# Patient Record
Sex: MALE | Race: WHITE | NOT HISPANIC OR LATINO | Employment: FULL TIME | URBAN - METROPOLITAN AREA
[De-identification: names, ages, dates, MRNs, and addresses within clinical notes are randomized per-mention and may not be internally consistent; named-entity substitution may affect disease eponyms.]

---

## 2021-10-12 ENCOUNTER — APPOINTMENT (OUTPATIENT)
Dept: GENERAL RADIOLOGY | Facility: HOSPITAL | Age: 41
End: 2021-10-12

## 2021-10-12 PROCEDURE — 73110 X-RAY EXAM OF WRIST: CPT | Performed by: EMERGENCY MEDICINE

## 2022-05-22 ENCOUNTER — HOSPITAL ENCOUNTER (EMERGENCY)
Facility: HOSPITAL | Age: 42
Discharge: LEFT BEFORE DISCHARGE | End: 2022-05-22

## 2022-05-22 VITALS
BODY MASS INDEX: 27.61 KG/M2 | OXYGEN SATURATION: 99 % | DIASTOLIC BLOOD PRESSURE: 77 MMHG | SYSTOLIC BLOOD PRESSURE: 126 MMHG | HEART RATE: 87 BPM | WEIGHT: 175.93 LBS | RESPIRATION RATE: 16 BRPM | TEMPERATURE: 97.6 F | HEIGHT: 67 IN

## 2022-05-22 LAB — QT INTERVAL: 343 MS

## 2022-05-22 PROCEDURE — 93005 ELECTROCARDIOGRAM TRACING: CPT

## 2022-05-22 PROCEDURE — 99211 OFF/OP EST MAY X REQ PHY/QHP: CPT

## 2023-10-09 ENCOUNTER — OFFICE VISIT (OUTPATIENT)
Dept: INTERNAL MEDICINE | Facility: CLINIC | Age: 43
End: 2023-10-09
Payer: COMMERCIAL

## 2023-10-09 VITALS
HEIGHT: 67 IN | HEART RATE: 61 BPM | RESPIRATION RATE: 14 BRPM | OXYGEN SATURATION: 98 % | DIASTOLIC BLOOD PRESSURE: 62 MMHG | SYSTOLIC BLOOD PRESSURE: 98 MMHG | BODY MASS INDEX: 27 KG/M2 | WEIGHT: 172 LBS

## 2023-10-09 DIAGNOSIS — Z00.00 HEALTHCARE MAINTENANCE: ICD-10-CM

## 2023-10-09 DIAGNOSIS — Z23 NEED FOR TDAP VACCINATION: ICD-10-CM

## 2023-10-09 DIAGNOSIS — R53.83 EXHAUSTION: ICD-10-CM

## 2023-10-09 DIAGNOSIS — R56.9 SEIZURE: ICD-10-CM

## 2023-10-09 DIAGNOSIS — Z23 NEEDS FLU SHOT: ICD-10-CM

## 2023-10-09 DIAGNOSIS — Z00.00 VISIT FOR WELL MAN HEALTH CHECK: Primary | ICD-10-CM

## 2023-10-10 LAB
ALBUMIN SERPL-MCNC: 4.8 G/DL (ref 3.5–5.2)
ALBUMIN/GLOB SERPL: 1.7 G/DL
ALP SERPL-CCNC: 100 U/L (ref 39–117)
ALT SERPL-CCNC: 20 U/L (ref 1–41)
AST SERPL-CCNC: 21 U/L (ref 1–40)
BASOPHILS # BLD AUTO: 0.04 10*3/MM3 (ref 0–0.2)
BASOPHILS NFR BLD AUTO: 0.7 % (ref 0–1.5)
BILIRUB SERPL-MCNC: 0.4 MG/DL (ref 0–1.2)
BUN SERPL-MCNC: 16 MG/DL (ref 6–20)
BUN/CREAT SERPL: 14.4 (ref 7–25)
CALCIUM SERPL-MCNC: 10.1 MG/DL (ref 8.6–10.5)
CHLORIDE SERPL-SCNC: 105 MMOL/L (ref 98–107)
CHOLEST SERPL-MCNC: 221 MG/DL (ref 0–200)
CO2 SERPL-SCNC: 26.7 MMOL/L (ref 22–29)
CREAT SERPL-MCNC: 1.11 MG/DL (ref 0.76–1.27)
EGFRCR SERPLBLD CKD-EPI 2021: 84.5 ML/MIN/1.73
EOSINOPHIL # BLD AUTO: 0.07 10*3/MM3 (ref 0–0.4)
EOSINOPHIL NFR BLD AUTO: 1.3 % (ref 0.3–6.2)
ERYTHROCYTE [DISTWIDTH] IN BLOOD BY AUTOMATED COUNT: 13.4 % (ref 12.3–15.4)
FT4I SERPL CALC-MCNC: 2.2 (ref 1.2–4.9)
GLOBULIN SER CALC-MCNC: 2.9 GM/DL
GLUCOSE SERPL-MCNC: 96 MG/DL (ref 65–99)
HBA1C MFR BLD: 5.6 % (ref 4.8–5.6)
HCT VFR BLD AUTO: 49 % (ref 37.5–51)
HDLC SERPL-MCNC: 47 MG/DL (ref 40–60)
HGB BLD-MCNC: 16.3 G/DL (ref 13–17.7)
IMM GRANULOCYTES # BLD AUTO: 0.01 10*3/MM3 (ref 0–0.05)
IMM GRANULOCYTES NFR BLD AUTO: 0.2 % (ref 0–0.5)
LDLC SERPL CALC-MCNC: 164 MG/DL (ref 0–100)
LDLC/HDLC SERPL: 3.45 {RATIO}
LYMPHOCYTES # BLD AUTO: 1.57 10*3/MM3 (ref 0.7–3.1)
LYMPHOCYTES NFR BLD AUTO: 28.8 % (ref 19.6–45.3)
MCH RBC QN AUTO: 27.6 PG (ref 26.6–33)
MCHC RBC AUTO-ENTMCNC: 33.3 G/DL (ref 31.5–35.7)
MCV RBC AUTO: 82.9 FL (ref 79–97)
MONOCYTES # BLD AUTO: 0.52 10*3/MM3 (ref 0.1–0.9)
MONOCYTES NFR BLD AUTO: 9.5 % (ref 5–12)
NEUTROPHILS # BLD AUTO: 3.25 10*3/MM3 (ref 1.7–7)
NEUTROPHILS NFR BLD AUTO: 59.5 % (ref 42.7–76)
NRBC BLD AUTO-RTO: 0 /100 WBC (ref 0–0.2)
PLATELET # BLD AUTO: 304 10*3/MM3 (ref 140–450)
POTASSIUM SERPL-SCNC: 5 MMOL/L (ref 3.5–5.2)
PROT SERPL-MCNC: 7.7 G/DL (ref 6–8.5)
RBC # BLD AUTO: 5.91 10*6/MM3 (ref 4.14–5.8)
SODIUM SERPL-SCNC: 140 MMOL/L (ref 136–145)
T3RU NFR SERPL: 28 % (ref 24–39)
T4 SERPL-MCNC: 7.9 UG/DL (ref 4.5–12)
TRIGL SERPL-MCNC: 59 MG/DL (ref 0–150)
TSH SERPL DL<=0.005 MIU/L-ACNC: 2.07 UIU/ML (ref 0.45–4.5)
VLDLC SERPL CALC-MCNC: 10 MG/DL (ref 5–40)
WBC # BLD AUTO: 5.46 10*3/MM3 (ref 3.4–10.8)

## 2023-10-13 ENCOUNTER — PATIENT ROUNDING (BHMG ONLY) (OUTPATIENT)
Dept: INTERNAL MEDICINE | Facility: CLINIC | Age: 43
End: 2023-10-13
Payer: COMMERCIAL

## 2024-09-30 ENCOUNTER — TELEPHONE (OUTPATIENT)
Dept: INTERNAL MEDICINE | Facility: CLINIC | Age: 44
End: 2024-09-30
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
## 2024-10-08 ENCOUNTER — OFFICE VISIT (OUTPATIENT)
Dept: INTERNAL MEDICINE | Facility: CLINIC | Age: 44
End: 2024-10-08
Payer: OTHER MISCELLANEOUS

## 2024-10-08 VITALS
BODY MASS INDEX: 26.85 KG/M2 | DIASTOLIC BLOOD PRESSURE: 84 MMHG | HEART RATE: 84 BPM | OXYGEN SATURATION: 99 % | SYSTOLIC BLOOD PRESSURE: 118 MMHG | TEMPERATURE: 98.6 F | WEIGHT: 171.1 LBS | HEIGHT: 67 IN

## 2024-10-08 DIAGNOSIS — Z23 FLU VACCINE NEED: ICD-10-CM

## 2024-10-08 DIAGNOSIS — M25.511 ACUTE PAIN OF RIGHT SHOULDER: Primary | ICD-10-CM

## 2024-10-08 PROCEDURE — 99214 OFFICE O/P EST MOD 30 MIN: CPT | Performed by: FAMILY MEDICINE

## 2024-10-08 PROCEDURE — 90471 IMMUNIZATION ADMIN: CPT | Performed by: FAMILY MEDICINE

## 2024-10-08 PROCEDURE — 90656 IIV3 VACC NO PRSV 0.5 ML IM: CPT | Performed by: FAMILY MEDICINE

## 2024-10-08 RX ORDER — DIPHENOXYLATE HYDROCHLORIDE AND ATROPINE SULFATE 2.5; .025 MG/1; MG/1
TABLET ORAL
COMMUNITY
Start: 2024-08-20

## 2024-10-08 RX ORDER — OXYCODONE HYDROCHLORIDE 5 MG/1
TABLET ORAL
COMMUNITY
Start: 2024-08-20

## 2024-10-08 RX ORDER — EPINEPHRINE 0.3 MG/.3ML
INJECTION SUBCUTANEOUS
COMMUNITY
Start: 2024-05-02

## 2024-10-08 RX ORDER — SENNOSIDES 8.6 MG
1000 CAPSULE ORAL
COMMUNITY
Start: 2024-08-20

## 2024-10-08 RX ORDER — MONTELUKAST SODIUM 10 MG/1
10 TABLET ORAL
COMMUNITY

## 2024-10-19 ENCOUNTER — HOSPITAL ENCOUNTER (OUTPATIENT)
Dept: MRI IMAGING | Facility: HOSPITAL | Age: 44
Discharge: HOME | End: 2024-10-19
Admitting: FAMILY MEDICINE
Payer: COMMERCIAL

## 2024-10-19 DIAGNOSIS — M25.511 ACUTE PAIN OF RIGHT SHOULDER: ICD-10-CM

## 2024-10-19 PROCEDURE — 73221 MRI JOINT UPR EXTREM W/O DYE: CPT

## 2024-10-30 DIAGNOSIS — S22.31XA CLOSED FRACTURE OF ONE RIB OF RIGHT SIDE, INITIAL ENCOUNTER: ICD-10-CM

## 2024-10-30 DIAGNOSIS — M25.511 ACUTE PAIN OF RIGHT SHOULDER: Primary | ICD-10-CM

## 2025-04-28 ENCOUNTER — OFFICE VISIT (OUTPATIENT)
Dept: INTERNAL MEDICINE | Facility: CLINIC | Age: 45
End: 2025-04-28
Payer: COMMERCIAL

## 2025-04-28 ENCOUNTER — HOSPITAL ENCOUNTER (OUTPATIENT)
Dept: GENERAL RADIOLOGY | Facility: HOSPITAL | Age: 45
Discharge: HOME | End: 2025-04-28
Payer: COMMERCIAL

## 2025-04-28 ENCOUNTER — LAB (OUTPATIENT)
Dept: LAB | Facility: HOSPITAL | Age: 45
End: 2025-04-28
Payer: COMMERCIAL

## 2025-04-28 VITALS
HEIGHT: 67 IN | HEART RATE: 71 BPM | OXYGEN SATURATION: 98 % | WEIGHT: 176 LBS | DIASTOLIC BLOOD PRESSURE: 74 MMHG | BODY MASS INDEX: 27.62 KG/M2 | TEMPERATURE: 98 F | RESPIRATION RATE: 16 BRPM | SYSTOLIC BLOOD PRESSURE: 118 MMHG

## 2025-04-28 DIAGNOSIS — Z00.00 HEALTHCARE MAINTENANCE: ICD-10-CM

## 2025-04-28 DIAGNOSIS — Z12.11 ENCOUNTER FOR SCREENING COLONOSCOPY: ICD-10-CM

## 2025-04-28 DIAGNOSIS — M54.12 CERVICAL RADICULOPATHY: ICD-10-CM

## 2025-04-28 DIAGNOSIS — Z00.00 VISIT FOR WELL MAN HEALTH CHECK: Primary | ICD-10-CM

## 2025-04-28 LAB
25(OH)D3 SERPL-MCNC: 44.5 NG/ML (ref 30–100)
ALBUMIN SERPL-MCNC: 4.3 G/DL (ref 3.5–5.2)
ALBUMIN/GLOB SERPL: 1.1 G/DL
ALP SERPL-CCNC: 117 U/L (ref 39–117)
ALT SERPL W P-5'-P-CCNC: 29 U/L (ref 1–41)
ANION GAP SERPL CALCULATED.3IONS-SCNC: 9 MMOL/L (ref 5–15)
AST SERPL-CCNC: 22 U/L (ref 1–40)
BASOPHILS # BLD AUTO: 0.03 10*3/MM3 (ref 0–0.2)
BASOPHILS NFR BLD AUTO: 0.5 % (ref 0–1.5)
BILIRUB SERPL-MCNC: 0.4 MG/DL (ref 0–1.2)
BUN SERPL-MCNC: 18 MG/DL (ref 6–20)
BUN/CREAT SERPL: 14.5 (ref 7–25)
CALCIUM SPEC-SCNC: 9.5 MG/DL (ref 8.6–10.5)
CHLORIDE SERPL-SCNC: 106 MMOL/L (ref 98–107)
CHOLEST SERPL-MCNC: 206 MG/DL (ref 0–200)
CO2 SERPL-SCNC: 26 MMOL/L (ref 22–29)
CREAT SERPL-MCNC: 1.24 MG/DL (ref 0.76–1.27)
DEPRECATED RDW RBC AUTO: 43.2 FL (ref 37–54)
EGFRCR SERPLBLD CKD-EPI 2021: 73.5 ML/MIN/1.73
EOSINOPHIL # BLD AUTO: 0.06 10*3/MM3 (ref 0–0.4)
EOSINOPHIL NFR BLD AUTO: 1 % (ref 0.3–6.2)
ERYTHROCYTE [DISTWIDTH] IN BLOOD BY AUTOMATED COUNT: 14.5 % (ref 12.3–15.4)
GLOBULIN UR ELPH-MCNC: 3.8 GM/DL
GLUCOSE SERPL-MCNC: 95 MG/DL (ref 65–99)
HBA1C MFR BLD: 5.6 % (ref 4.8–5.6)
HCT VFR BLD AUTO: 51.7 % (ref 37.5–51)
HCV AB SER QL: NORMAL
HDLC SERPL-MCNC: 39 MG/DL (ref 40–60)
HGB BLD-MCNC: 16.1 G/DL (ref 13–17.7)
IMM GRANULOCYTES # BLD AUTO: 0.01 10*3/MM3 (ref 0–0.05)
IMM GRANULOCYTES NFR BLD AUTO: 0.2 % (ref 0–0.5)
LDLC SERPL CALC-MCNC: 151 MG/DL (ref 0–100)
LDLC/HDLC SERPL: 3.82 {RATIO}
LYMPHOCYTES # BLD AUTO: 1.56 10*3/MM3 (ref 0.7–3.1)
LYMPHOCYTES NFR BLD AUTO: 25 % (ref 19.6–45.3)
MCH RBC QN AUTO: 26 PG (ref 26.6–33)
MCHC RBC AUTO-ENTMCNC: 31.1 G/DL (ref 31.5–35.7)
MCV RBC AUTO: 83.5 FL (ref 79–97)
MONOCYTES # BLD AUTO: 0.56 10*3/MM3 (ref 0.1–0.9)
MONOCYTES NFR BLD AUTO: 9 % (ref 5–12)
NEUTROPHILS NFR BLD AUTO: 4.03 10*3/MM3 (ref 1.7–7)
NEUTROPHILS NFR BLD AUTO: 64.3 % (ref 42.7–76)
NRBC BLD AUTO-RTO: 0 /100 WBC (ref 0–0.2)
PLATELET # BLD AUTO: 338 10*3/MM3 (ref 140–450)
PMV BLD AUTO: 9.6 FL (ref 6–12)
POTASSIUM SERPL-SCNC: 4.6 MMOL/L (ref 3.5–5.2)
PROT SERPL-MCNC: 8.1 G/DL (ref 6–8.5)
RBC # BLD AUTO: 6.19 10*6/MM3 (ref 4.14–5.8)
SODIUM SERPL-SCNC: 141 MMOL/L (ref 136–145)
T-UPTAKE NFR SERPL: 1.14 TBI (ref 0.8–1.3)
T4 SERPL-MCNC: 6.78 MCG/DL (ref 4.5–11.7)
TRIGL SERPL-MCNC: 90 MG/DL (ref 0–150)
TSH SERPL DL<=0.05 MIU/L-ACNC: 2.07 UIU/ML (ref 0.27–4.2)
VLDLC SERPL-MCNC: 16 MG/DL (ref 5–40)
WBC NRBC COR # BLD AUTO: 6.25 10*3/MM3 (ref 3.4–10.8)

## 2025-04-28 PROCEDURE — 84436 ASSAY OF TOTAL THYROXINE: CPT | Performed by: FAMILY MEDICINE

## 2025-04-28 PROCEDURE — 85025 COMPLETE CBC W/AUTO DIFF WBC: CPT | Performed by: FAMILY MEDICINE

## 2025-04-28 PROCEDURE — 80053 COMPREHEN METABOLIC PANEL: CPT | Performed by: FAMILY MEDICINE

## 2025-04-28 PROCEDURE — 80061 LIPID PANEL: CPT | Performed by: FAMILY MEDICINE

## 2025-04-28 PROCEDURE — 83036 HEMOGLOBIN GLYCOSYLATED A1C: CPT | Performed by: FAMILY MEDICINE

## 2025-04-28 PROCEDURE — 84443 ASSAY THYROID STIM HORMONE: CPT | Performed by: FAMILY MEDICINE

## 2025-04-28 PROCEDURE — 36415 COLL VENOUS BLD VENIPUNCTURE: CPT | Performed by: FAMILY MEDICINE

## 2025-04-28 PROCEDURE — 84479 ASSAY OF THYROID (T3 OR T4): CPT | Performed by: FAMILY MEDICINE

## 2025-04-28 PROCEDURE — 82306 VITAMIN D 25 HYDROXY: CPT | Performed by: FAMILY MEDICINE

## 2025-04-28 PROCEDURE — 72040 X-RAY EXAM NECK SPINE 2-3 VW: CPT

## 2025-04-28 PROCEDURE — 86803 HEPATITIS C AB TEST: CPT | Performed by: FAMILY MEDICINE

## 2025-04-28 RX ORDER — DESLORATADINE 5 MG/1
TABLET, ORALLY DISINTEGRATING ORAL
COMMUNITY
Start: 2025-02-26

## 2025-04-28 RX ORDER — AZELASTINE HYDROCHLORIDE, FLUTICASONE PROPIONATE 137; 50 UG/1; UG/1
1 SPRAY, METERED NASAL 2 TIMES DAILY
COMMUNITY
Start: 2025-02-26

## 2025-05-07 ENCOUNTER — TREATMENT (OUTPATIENT)
Dept: PHYSICAL THERAPY | Facility: CLINIC | Age: 45
End: 2025-05-07
Payer: COMMERCIAL

## 2025-05-07 DIAGNOSIS — R29.3 POSTURE ABNORMALITY: ICD-10-CM

## 2025-05-07 DIAGNOSIS — M54.12 RADICULOPATHY, CERVICAL: ICD-10-CM

## 2025-05-07 DIAGNOSIS — M54.2 PAIN, NECK: Primary | ICD-10-CM

## 2025-05-07 NOTE — PATIENT INSTRUCTIONS
Access Code: [email or web address]  URL: [email or web address]  Date: 05/07/2025  Prepared by: Kevin Sainz    Exercises  - Doorway Pec Stretch at 90 Degrees Abduction  - 1 x daily - 7 x weekly - 3 sets - 10 reps  - Shoulder External Rotation and Scapular Retraction with Resistance  - 1 x daily - 5 x weekly - 3 sets - 10 reps  - Shoulder Flexion Serratus Activation with Resistance  - 1 x daily - 5 x weekly - 3 sets - 5 reps  - Seated Cervical Retraction  - 1 x daily - 7 x weekly - 1 sets - 30 reps

## 2025-05-07 NOTE — PROGRESS NOTES
Subjective   Cheo Byers is a 44 y.o. male and is here for a comprehensive physical exam. The patient reports no problems.    Pt is UTD with annual gyn exam and mammo           Social History:   Social History     Socioeconomic History    Marital status: Single   Tobacco Use    Smoking status: Former     Current packs/day: 0.00     Average packs/day: 0.2 packs/day for 34.3 years (6.1 ttl pk-yrs)     Types: Cigarettes, Electronic Cigarette     Start date: 1997     Quit date: 2016     Years since quittin.3     Passive exposure: Past    Smokeless tobacco: Never   Vaping Use    Vaping status: Some Days   Substance and Sexual Activity    Alcohol use: Yes     Alcohol/week: 2.0 standard drinks of alcohol     Types: 1 Cans of beer, 1 Drinks containing 0.5 oz of alcohol per week     Comment: occasionally    Drug use: Yes     Types: Marijuana    Sexual activity: Yes     Partners: Female     Birth control/protection: Condom, I.U.D., Birth control pill       Family History:   Family History   Problem Relation Age of Onset    Hypertension Father        Past Medical History:   Past Medical History:   Diagnosis Date    Kidney stone     Seizures 2022    Doc told me it was lack of sleep and to see a doctor if I experience another ine which i havent.       The following portions of the patient's history were reviewed and updated as appropriate: allergies, current medications, past family history, past medical history, past social history, past surgical history and problem list.    Review of Systems    Review of Systems   Constitutional:  Negative for chills and fever.   HENT:  Negative for congestion, rhinorrhea, sinus pain and sore throat.    Eyes:  Negative for photophobia and visual disturbance.   Respiratory:  Negative for cough, chest tightness and shortness of breath.    Cardiovascular:  Negative for chest pain and palpitations.   Gastrointestinal:  Negative for diarrhea, nausea and vomiting.    Genitourinary:  Negative for dysuria, frequency and urgency.   Skin:  Negative for rash and wound.   Neurological:  Negative for dizziness and syncope.   Psychiatric/Behavioral:  Negative for behavioral problems and confusion.        Objective   Physical Exam  Vitals and nursing note reviewed.   Constitutional:       Appearance: He is well-developed.   HENT:      Head: Normocephalic and atraumatic.      Right Ear: External ear normal.      Left Ear: External ear normal.   Cardiovascular:      Rate and Rhythm: Normal rate and regular rhythm.      Heart sounds: Normal heart sounds.   Pulmonary:      Effort: Pulmonary effort is normal. No respiratory distress.      Breath sounds: Normal breath sounds.   Abdominal:      Palpations: Abdomen is soft.      Tenderness: There is no abdominal tenderness. There is no guarding.   Musculoskeletal:         General: Normal range of motion.      Cervical back: Normal range of motion and neck supple.   Lymphadenopathy:      Cervical: No cervical adenopathy.   Skin:     General: Skin is warm.   Neurological:      Mental Status: He is alert and oriented to person, place, and time.   Psychiatric:         Behavior: Behavior normal.         Vitals:    04/28/25 0936   BP: 118/74   Pulse: 71   Resp: 16   Temp: 98 °F (36.7 °C)   SpO2: 98%     Body mass index is 27.56 kg/m².      Medications:   Current Outpatient Medications:     acetaminophen (Tylenol 8 Hour) 650 MG 8 hr tablet, 1,000 mg., Disp: , Rfl:     Azelastine-Fluticasone 137-50 MCG/ACT suspension, 1 spray by Alternating Nares route 2 (Two) Times a Day. shake liquid, Disp: , Rfl:     desloratadine (CLARINEX REDITAB) 5 MG disintegrating tablet, DISSOLVE 1 TABLET ON THE TONGUE DAILY, Disp: , Rfl:     EPINEPHrine (EPIPEN) 0.3 MG/0.3ML solution auto-injector injection, , Disp: , Rfl:     montelukast (SINGULAIR) 10 MG tablet, Take 1 tablet by mouth every night at bedtime., Disp: , Rfl:     multivitamin (MULTIPLE VITAMINS PO), Oral, Cap,  Daily, 0 Refill(s), Disp: , Rfl:        Assessment & Plan   Healthy male exam.      1. Healthcare Maintenance:  2. Patient Counseling:  --Nutrition: Stressed importance of moderation in sodium/caffeine intake, saturated fat and cholesterol, caloric balance, sufficient intake of fresh fruits, vegetables, fiber, calcium and vit D  --Exercise: Recommended 30 minutes of exercise daily.  --Immunizations reviewed.  --Discussed benefits of screening colonoscopy.    Diagnoses and all orders for this visit:    Visit for well man health check    Healthcare maintenance  -     CBC & Differential  -     Comprehensive Metabolic Panel  -     Hemoglobin A1c  -     Thyroid Panel With TSH  -     Lipid Panel With LDL / HDL Ratio  -     Vitamin D,25-Hydroxy  -     Hepatitis C Antibody    Cervical radiculopathy  -     XR Spine Cervical 2 or 3 View  -     Ambulatory Referral to Physical Therapy for Evaluation & Treatment    Encounter for screening colonoscopy  -     Ambulatory Referral For Screening Colonoscopy    Other orders  -     desloratadine (CLARINEX REDITAB) 5 MG disintegrating tablet; DISSOLVE 1 TABLET ON THE TONGUE DAILY  -     Azelastine-Fluticasone 137-50 MCG/ACT suspension; 1 spray by Alternating Nares route 2 (Two) Times a Day. shake liquid        No follow-ups on file.             Dictated utilizing Dragon Voice Recognition Software

## 2025-05-07 NOTE — PROGRESS NOTES
"Chief Complaint  Annual Exam and Numbness (RT arm X 2 months )    Subjective          Cheo Byers presents to [facility]  History of Present Illness  The patient came in to discuss right arm numbness, a bunion, and a history of seizures.    He reports that his right arm goes numb, especially in certain positions, and it feels worse in the upper part of the arm. This numbness happens both at night, waking him up around 3:00 AM, and during work hours when he's using a mouse at his desk. He tries to sleep on his left side to avoid the numbness but often ends up rolling over onto his right side. He had an MRI for a  shoulder before but isn't sure if it's related to his current symptoms. Although he was referred to physical therapy, he didn't follow through with it.    He has been seizure-free for over two years and isn't seeing a neurologist or taking any seizure medications. He believes his past seizures were due to accidentally taking too much Biotin. After stopping the high dosage and going back to his normal sleep routine, he hasn't had any more seizures. He occasionally takes vitamins and supplements but isn't on any prescribed medications.    He also has a bunion on his foot that hasn't improved despite using a device from the hospital. The bunion feels slightly swollen and bothers him when he walks.    He reports no issues with urination or bowel movements and no shortness of breath or chest pain.    Objective   Vital Signs:   /74 (BP Location: Left arm, Patient Position: Sitting)   Pulse 71   Temp 98 °F (36.7 °C) (Oral)   Resp 16   Ht 170.2 cm (67.01\")   Wt 79.8 kg (176 lb)   SpO2 98%   BMI 27.56 kg/m²     Physical Exam  Vitals and nursing note reviewed.   Constitutional:       Appearance: He is well-developed.   HENT:      Head: Normocephalic and atraumatic.   Musculoskeletal:      Cervical back: Normal range of motion and neck supple.   Neurological:      " Mental Status: He is alert and oriented to person, place, and time.   Psychiatric:         Behavior: Behavior normal.         Physical Exam  Mouth/Throat: Mucous membranes moist, no erythema, no exudate  Neck: Supple, no abnormalities  Respiratory: Clear to auscultation, no wheezing, rales or rhonchi  Cardiovascular: Regular rate and rhythm, no murmurs, rubs, or gallops  Gastrointestinal: Soft, no tenderness, no distention, no masses     Result Review :                 Assessment and Plan    Diagnoses and all orders for this visit:    1. Visit for well man health check (Primary)    2. Healthcare maintenance  -     CBC & Differential  -     Comprehensive Metabolic Panel  -     Hemoglobin A1c  -     Thyroid Panel With TSH  -     Lipid Panel With LDL / HDL Ratio  -     Vitamin D,25-Hydroxy  -     Hepatitis C Antibody    3. Cervical radiculopathy  -     XR Spine Cervical 2 or 3 View  -     Ambulatory Referral to Physical Therapy for Evaluation & Treatment    4. Encounter for screening colonoscopy  -     Ambulatory Referral For Screening Colonoscopy      Assessment & Plan  1. Cervical radiculopathy.  - Symptoms suggest cervical radiculopathy, likely due to nerve impingement from muscle tension.  - Physical exam indicates numbness in the right arm, particularly at night and while using a mouse at work.  - A cervical x-ray will be ordered to confirm the diagnosis. A referral to physical therapy will be made to learn exercises that can help alleviate the symptoms.  - Physical therapy referral will be made to address muscle tension and nerve impingement.    2. Seizure disorder.  - He has been seizure-free for over 2 years.  - No current seizure medications are being taken. He believes a past seizure was related to an accidental overdose of Biotin supplements, which he has since corrected.  - Cleared to resume driving based on the absence of recent seizures and lack of current symptoms.  - No further neurological evaluation  or medication changes needed at this time.    3. Bunion.  - Reports using a device provided by the hospital without significant improvement.  - Physical exam indicates swelling and discomfort in the affected area.  - He does not wish to pursue a referral to a podiatrist at this time. It was explained that bunions can take a long time to improve and sometimes may not improve without surgical intervention.  - Conservative management will continue with the use of the provided device.    4. Health maintenance.  - A comprehensive set of blood tests will be conducted today.  - No current complaints of shortness of breath, chest pain, or gastrointestinal issues.  - An order for a colonoscopy will be placed as he is approaching 45 years of age.  - Routine physical exam findings are unremarkable.    Follow Up   No follow-ups on file.  Patient was given instructions and counseling regarding his condition or for health maintenance advice. Please see specific information pulled into the AVS if appropriate.           Patient or patient representative verbalized consent for the use of Ambient Listening during the visit with  Jeffrey Mello MD for chart documentation. 5/7/2025  09:46 EDT
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
## 2025-05-09 ENCOUNTER — TREATMENT (OUTPATIENT)
Dept: PHYSICAL THERAPY | Facility: CLINIC | Age: 45
End: 2025-05-09
Payer: COMMERCIAL

## 2025-05-09 DIAGNOSIS — M54.12 RADICULOPATHY, CERVICAL: ICD-10-CM

## 2025-05-09 DIAGNOSIS — M54.2 PAIN, NECK: Primary | ICD-10-CM

## 2025-05-09 DIAGNOSIS — R29.3 POSTURE ABNORMALITY: ICD-10-CM

## 2025-05-09 NOTE — PROGRESS NOTES
Physical Therapy Daily Treatment Note  [facility] [place]   [address], [address]  [place] [address]  P: [phone]  F: [phone]    Patient: Cheo Byers   : 1980  Referring practitioner: Jeffrey Mello MD  Date of Initial Visit: Type: THERAPY  Noted: 2025  Today's Date: 2025  Patient seen for 2 sessions       Visit Diagnoses:    ICD-10-CM ICD-9-CM   1. Pain, neck  M54.2 723.1   2. Radiculopathy, cervical  M54.12 723.4   3. Posture abnormality  R29.3 781.92       Subjective     Cheo Byers reports: he was really sore following last session and had to take a day off of his HEP. He has since recovered and is back to baseline.    Objective   See Exercise, Manual, and Modality Logs for complete treatment.       Assessment:  Pt tolerated today's treatment session well with no adverse responses during treatment session. Pt continues to display limitations including posterior chain strength deficits and postural deficits impacting his UE radiculopathy symptoms. HEP progressed to add prone T and lower trap setting at wall. Pt will benefit from continued skilled PT services.       Plan:  Progress per POC.         Timed:         Manual Therapy:         mins  38746;     Therapeutic Exercise:    25     mins  94774;     Neuromuscular Mary:    10    mins  56850;    Therapeutic Activity:          mins  30233;     Gait Training:           mins  99262;     Ultrasound:          mins  59977;    Ionto                                   mins  39043  Self Care                            mins  80851  Traction          mins 03537      Un-Timed:  Canalith Repos         mins 77633  Electrical Stimulation:         mins  00305 ( );  Dry Needling          mins self-pay  Traction          mins 06977        Timed Treatment:   35   mins   Total Treatment:     35   mins    Kevin Sainz PT  [place] License #: [id number]    Physical Therapist

## 2025-05-14 ENCOUNTER — TREATMENT (OUTPATIENT)
Dept: PHYSICAL THERAPY | Facility: CLINIC | Age: 45
End: 2025-05-14
Payer: COMMERCIAL

## 2025-05-14 DIAGNOSIS — M54.12 RADICULOPATHY, CERVICAL: ICD-10-CM

## 2025-05-14 DIAGNOSIS — R29.3 POSTURE ABNORMALITY: ICD-10-CM

## 2025-05-14 DIAGNOSIS — M54.2 PAIN, NECK: Primary | ICD-10-CM

## 2025-05-14 NOTE — PROGRESS NOTES
Physical Therapy Daily Treatment Note  [facility] [place]   [address], [address]  [place] [address]  P: [phone]  F: [phone]    Patient: Cheo Byers   : 1980  Referring practitioner: Jeffrey Mello MD  Date of Initial Visit: Type: THERAPY  Noted: 2025  Today's Date: 2025  Patient seen for 3 sessions       Visit Diagnoses:    ICD-10-CM ICD-9-CM   1. Pain, neck  M54.2 723.1   2. Radiculopathy, cervical  M54.12 723.4   3. Posture abnormality  R29.3 781.92       Subjective     Cheo Byers reports: no new complaints. Arm fell asleep just once on Monday for 5-10 seconds then it went away. Believes he has improved due to the reduced experience of symptoms.    Objective   See Exercise, Manual, and Modality Logs for complete treatment.       Assessment:  Pt tolerated today's treatment session well with no adverse responses during treatment session. Pt continues to display limitations including shoulder strength and postural deficits but appears to be progressing based on subjective reports. Pt will benefit from continued skilled PT services.       Plan:  Progress per POC.         Timed:         Manual Therapy:         mins  55377;     Therapeutic Exercise:    23     mins  29914;     Neuromuscular Mary:    9    mins  41642;    Therapeutic Activity:     8     mins  21166;     Gait Training:           mins  71319;     Ultrasound:          mins  83520;    Ionto                                   mins  34204  Self Care                            mins  19594  Traction          mins 15666      Un-Timed:  Canalith Repos         mins 04836  Electrical Stimulation:         mins  49874 ( );  Dry Needling          mins self-pay  Traction          mins 97374        Timed Treatment:   40   mins   Total Treatment:     40   mins    Kevin Sainz PT  [place] License #: [id number]    Physical Therapist

## 2025-05-20 DIAGNOSIS — M54.12 CERVICAL RADICULOPATHY: Primary | ICD-10-CM

## 2025-05-30 ENCOUNTER — TREATMENT (OUTPATIENT)
Dept: PHYSICAL THERAPY | Facility: CLINIC | Age: 45
End: 2025-05-30
Payer: COMMERCIAL

## 2025-05-30 DIAGNOSIS — R29.3 POSTURE ABNORMALITY: ICD-10-CM

## 2025-05-30 DIAGNOSIS — M54.12 RADICULOPATHY, CERVICAL: ICD-10-CM

## 2025-05-30 DIAGNOSIS — M54.2 PAIN, NECK: Primary | ICD-10-CM

## 2025-05-30 NOTE — PATIENT INSTRUCTIONS
Access Code: [email or web address]  URL: [email or web address]  Date: 05/30/2025  Prepared by: Kevin Sainz    Exercises  - Doorway Pec Stretch at 90 Degrees Abduction  - 1 x daily - 7 x weekly - 3 sets - 10 reps  - Shoulder External Rotation and Scapular Retraction with Resistance  - 1 x daily - 3 x weekly - 3 sets - 10 reps  - Prone Cervical Retraction Off End of Bed  - 1 x daily - 3 x weekly - 3 sets - 10 reps - 3s hold  - Prone Scapular Retraction Y  - 1 x daily - 3 x weekly - 3 sets - 10 reps  - Prone Scapular Retraction Arms at Side  - 1 x daily - 3 x weekly - 3 sets - 10 reps  - Supine Shoulder Horizontal Abduction with Resistance  - 1 x daily - 3 x weekly - 3 sets - 10 reps  - Sidelying Cervical Sidebending  - 1 x daily - 3 x weekly - 3 sets - 10 reps - 3s hold

## 2025-05-30 NOTE — PROGRESS NOTES
Physical Therapy Daily Treatment Note  [facility] [place]   [address], [address]  [place] [address]  P: [phone]  F: [phone]    Patient: Cheo Byers   : 1980  Referring practitioner: Jeffrey Mello MD  Date of Initial Visit: Type: THERAPY  Noted: 2025  Today's Date: 2025  Patient seen for 4 sessions       Visit Diagnoses:    ICD-10-CM ICD-9-CM   1. Pain, neck  M54.2 723.1   2. Radiculopathy, cervical  M54.12 723.4   3. Posture abnormality  R29.3 781.92       Subjective     Cheo Byers reports: only two minor instances of radicular symptoms the past two weeks. Pt reports he was recently informed of having scoliosis which he was not aware of. Has adhered to HEP.    Objective   See Exercise, Manual, and Modality Logs for complete treatment.       Assessment:  Pt appears to have made improvements and is managing symptoms well. HEP reviewed and revised today to include neck and shoulder strength and stability exercise progressions, which he demonstrated good tolerance to today in clinic. Pt subjectively reports he has adhered to his HEP and can keep up with these exercises at home.      Plan:  Plan to DC following today's session pending his current orders.         Timed:         Manual Therapy:         mins  61439;     Therapeutic Exercise:    8     mins  89651;     Neuromuscular Mary:    8    mins  09475;    Therapeutic Activity:          mins  56641;     Gait Training:           mins  57057;     Ultrasound:          mins  31279;    Ionto                                   mins  95427  Self Care                       8     mins  90356  Traction          mins 09959      Un-Timed:  Canalith Repos         mins 62846  Electrical Stimulation:         mins  00332 ( );  Dry Needling          mins self-pay  Traction          mins 07674        Timed Treatment:   24   mins   Total Treatment:     24   mins    Kevin Sainz, PT  [place] License #:  [id number]    Physical Therapist

## 2025-06-03 ENCOUNTER — OFFICE VISIT (OUTPATIENT)
Dept: SPORTS MEDICINE | Facility: CLINIC | Age: 45
End: 2025-06-03
Payer: COMMERCIAL

## 2025-06-03 VITALS
BODY MASS INDEX: 27.47 KG/M2 | RESPIRATION RATE: 16 BRPM | WEIGHT: 175 LBS | SYSTOLIC BLOOD PRESSURE: 102 MMHG | HEIGHT: 67 IN | OXYGEN SATURATION: 98 % | DIASTOLIC BLOOD PRESSURE: 60 MMHG | HEART RATE: 70 BPM

## 2025-06-03 DIAGNOSIS — M41.22 OTHER IDIOPATHIC SCOLIOSIS, CERVICAL REGION: ICD-10-CM

## 2025-06-03 DIAGNOSIS — M54.12 CERVICAL RADICULITIS: ICD-10-CM
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
## 2025-06-03 DIAGNOSIS — M54.2 NECK PAIN: ICD-10-CM
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
## 2025-08-05 ENCOUNTER — OFFICE VISIT (OUTPATIENT)
Dept: INTERNAL MEDICINE | Facility: CLINIC | Age: 45
End: 2025-08-05
Payer: COMMERCIAL

## 2025-08-05 VITALS
SYSTOLIC BLOOD PRESSURE: 110 MMHG | RESPIRATION RATE: 16 BRPM | HEART RATE: 66 BPM | OXYGEN SATURATION: 98 % | WEIGHT: 170.8 LBS | HEIGHT: 67 IN | BODY MASS INDEX: 26.81 KG/M2 | DIASTOLIC BLOOD PRESSURE: 74 MMHG | TEMPERATURE: 98.1 F

## 2025-08-05 DIAGNOSIS — M25.511 ACUTE PAIN OF RIGHT SHOULDER: Primary | ICD-10-CM

## 2025-08-05 DIAGNOSIS — Z12.11 ENCOUNTER FOR SCREENING COLONOSCOPY: ICD-10-CM

## 2025-08-05 DIAGNOSIS — J06.9 ACUTE URI: ICD-10-CM

## 2025-08-05 RX ORDER — AMOXICILLIN 500 MG/1
500 CAPSULE ORAL 2 TIMES DAILY
Qty: 14 CAPSULE | Refills: 0 | Status: SHIPPED | OUTPATIENT
Start: 2025-08-05 | End: 2025-08-12